# Patient Record
Sex: MALE | Race: WHITE | NOT HISPANIC OR LATINO | Employment: FULL TIME | URBAN - METROPOLITAN AREA
[De-identification: names, ages, dates, MRNs, and addresses within clinical notes are randomized per-mention and may not be internally consistent; named-entity substitution may affect disease eponyms.]

---

## 2018-06-28 ENCOUNTER — HOSPITAL ENCOUNTER (EMERGENCY)
Facility: HOSPITAL | Age: 23
Discharge: HOME | End: 2018-06-28
Attending: EMERGENCY MEDICINE
Payer: MEDICAID

## 2018-06-28 VITALS
TEMPERATURE: 99 F | HEIGHT: 70 IN | OXYGEN SATURATION: 98 % | DIASTOLIC BLOOD PRESSURE: 70 MMHG | HEART RATE: 60 BPM | RESPIRATION RATE: 18 BRPM | BODY MASS INDEX: 22.9 KG/M2 | SYSTOLIC BLOOD PRESSURE: 110 MMHG | WEIGHT: 160 LBS

## 2018-06-28 DIAGNOSIS — N50.811 TESTICULAR PAIN, RIGHT: ICD-10-CM

## 2018-06-28 DIAGNOSIS — N13.2 URETERAL STONE WITH HYDRONEPHROSIS: Primary | ICD-10-CM

## 2018-06-28 LAB
ALBUMIN SERPL BCP-MCNC: 4.6 G/DL
ALP SERPL-CCNC: 65 U/L
ALT SERPL W/O P-5'-P-CCNC: 21 U/L
ANION GAP SERPL CALC-SCNC: 14 MMOL/L
AST SERPL-CCNC: 22 U/L
BACTERIA #/AREA URNS AUTO: ABNORMAL /HPF
BASOPHILS # BLD AUTO: 0.03 K/UL
BASOPHILS NFR BLD: 0.2 %
BILIRUB SERPL-MCNC: 1.4 MG/DL
BILIRUB UR QL STRIP: NEGATIVE
BUN SERPL-MCNC: 17 MG/DL
CALCIUM SERPL-MCNC: 10.2 MG/DL
CHLORIDE SERPL-SCNC: 106 MMOL/L
CLARITY UR REFRACT.AUTO: ABNORMAL
CO2 SERPL-SCNC: 23 MMOL/L
COLOR UR AUTO: YELLOW
CREAT SERPL-MCNC: 1 MG/DL
DIFFERENTIAL METHOD: ABNORMAL
EOSINOPHIL # BLD AUTO: 0.1 K/UL
EOSINOPHIL NFR BLD: 0.6 %
ERYTHROCYTE [DISTWIDTH] IN BLOOD BY AUTOMATED COUNT: 11.2 %
EST. GFR  (AFRICAN AMERICAN): >60 ML/MIN/1.73 M^2
EST. GFR  (NON AFRICAN AMERICAN): >60 ML/MIN/1.73 M^2
GLUCOSE SERPL-MCNC: 110 MG/DL
GLUCOSE UR QL STRIP: NEGATIVE
HCT VFR BLD AUTO: 43.3 %
HGB BLD-MCNC: 14.7 G/DL
HGB UR QL STRIP: ABNORMAL
HYALINE CASTS UR QL AUTO: 0 /LPF
IMM GRANULOCYTES # BLD AUTO: 0.06 K/UL
IMM GRANULOCYTES NFR BLD AUTO: 0.5 %
KETONES UR QL STRIP: ABNORMAL
LEUKOCYTE ESTERASE UR QL STRIP: ABNORMAL
LIPASE SERPL-CCNC: 24 U/L
LYMPHOCYTES # BLD AUTO: 1.2 K/UL
LYMPHOCYTES NFR BLD: 9.4 %
MCH RBC QN AUTO: 33 PG
MCHC RBC AUTO-ENTMCNC: 33.9 G/DL
MCV RBC AUTO: 97 FL
MICROSCOPIC COMMENT: ABNORMAL
MONOCYTES # BLD AUTO: 0.4 K/UL
MONOCYTES NFR BLD: 3.4 %
NEUTROPHILS # BLD AUTO: 10.9 K/UL
NEUTROPHILS NFR BLD: 85.9 %
NITRITE UR QL STRIP: NEGATIVE
NRBC BLD-RTO: 0 /100 WBC
PH UR STRIP: 5 [PH] (ref 5–8)
PLATELET # BLD AUTO: 205 K/UL
PMV BLD AUTO: 9.7 FL
POTASSIUM SERPL-SCNC: 3.8 MMOL/L
PROT SERPL-MCNC: 7.9 G/DL
PROT UR QL STRIP: ABNORMAL
RBC # BLD AUTO: 4.45 M/UL
RBC #/AREA URNS AUTO: >100 /HPF (ref 0–4)
SODIUM SERPL-SCNC: 143 MMOL/L
SP GR UR STRIP: 1.02 (ref 1–1.03)
SQUAMOUS #/AREA URNS AUTO: 1 /HPF
URN SPEC COLLECT METH UR: ABNORMAL
UROBILINOGEN UR STRIP-ACNC: NEGATIVE EU/DL
WBC # BLD AUTO: 12.7 K/UL
WBC #/AREA URNS AUTO: 10 /HPF (ref 0–5)

## 2018-06-28 PROCEDURE — 99284 EMERGENCY DEPT VISIT MOD MDM: CPT | Mod: ,,, | Performed by: PHYSICIAN ASSISTANT

## 2018-06-28 PROCEDURE — 96375 TX/PRO/DX INJ NEW DRUG ADDON: CPT

## 2018-06-28 PROCEDURE — 63600175 PHARM REV CODE 636 W HCPCS: Performed by: PHYSICIAN ASSISTANT

## 2018-06-28 PROCEDURE — 81001 URINALYSIS AUTO W/SCOPE: CPT

## 2018-06-28 PROCEDURE — 96374 THER/PROPH/DIAG INJ IV PUSH: CPT

## 2018-06-28 PROCEDURE — 99284 EMERGENCY DEPT VISIT MOD MDM: CPT | Mod: 25

## 2018-06-28 PROCEDURE — 96361 HYDRATE IV INFUSION ADD-ON: CPT

## 2018-06-28 PROCEDURE — 25000003 PHARM REV CODE 250: Performed by: PHYSICIAN ASSISTANT

## 2018-06-28 PROCEDURE — 80053 COMPREHEN METABOLIC PANEL: CPT

## 2018-06-28 PROCEDURE — 85025 COMPLETE CBC W/AUTO DIFF WBC: CPT

## 2018-06-28 PROCEDURE — 83690 ASSAY OF LIPASE: CPT

## 2018-06-28 RX ORDER — ONDANSETRON 4 MG/1
4 TABLET, ORALLY DISINTEGRATING ORAL EVERY 8 HOURS PRN
Qty: 12 TABLET | Refills: 0 | Status: SHIPPED | OUTPATIENT
Start: 2018-06-28 | End: 2018-07-01

## 2018-06-28 RX ORDER — KETOROLAC TROMETHAMINE 30 MG/ML
10 INJECTION, SOLUTION INTRAMUSCULAR; INTRAVENOUS
Status: COMPLETED | OUTPATIENT
Start: 2018-06-28 | End: 2018-06-28

## 2018-06-28 RX ORDER — ONDANSETRON 2 MG/ML
4 INJECTION INTRAMUSCULAR; INTRAVENOUS
Status: COMPLETED | OUTPATIENT
Start: 2018-06-28 | End: 2018-06-28

## 2018-06-28 RX ORDER — HYDROCODONE BITARTRATE AND ACETAMINOPHEN 5; 325 MG/1; MG/1
1 TABLET ORAL EVERY 6 HOURS PRN
Qty: 10 TABLET | Refills: 0 | Status: SHIPPED | OUTPATIENT
Start: 2018-06-28

## 2018-06-28 RX ORDER — TAMSULOSIN HYDROCHLORIDE 0.4 MG/1
0.4 CAPSULE ORAL DAILY
Qty: 7 CAPSULE | Refills: 0 | Status: SHIPPED | OUTPATIENT
Start: 2018-06-28 | End: 2019-06-28

## 2018-06-28 RX ADMIN — KETOROLAC TROMETHAMINE 10 MG: 30 INJECTION, SOLUTION INTRAMUSCULAR at 07:06

## 2018-06-28 RX ADMIN — ONDANSETRON 4 MG: 2 INJECTION, SOLUTION INTRAMUSCULAR; INTRAVENOUS at 07:06

## 2018-06-28 RX ADMIN — SODIUM CHLORIDE 1000 ML: 0.9 INJECTION, SOLUTION INTRAVENOUS at 07:06

## 2018-06-28 NOTE — ED NOTES
Pt identifiers checked and accurate with Koko Bueno    Pt reports to ED with complaints of vomiting and back pain beginning this afternoon. Pt reports right lower back pain, pain when urinating, dry heaving. Pt denies trauma, falls, blood in urine, diarrhea, headache, dizziness, LOC.     LOC: The patient is awake, alert and aware of environment with an appropriate affect, the patient is oriented x 3 and speaking appropriately.  APPEARANCE: Patient resting comfortably and in no acute distress, patient is clean and well groomed  SKIN: The skin is warm and dry, color consistent with ethnicity, patient has normal skin turgor and moist mucus membranes, skin intact, no breakdown or bruising noted.  MUSCULOSKELETAL: Patient moving all extremities well, no obvious swelling or deformities noted. Pt ambulates unassisted, steady gait   RESPIRATORY: Airway is open and patent; respirations are spontaneous, patient has a normal effort and rate, no accessory muscle use noted.   ABDOMEN: Soft and tender to palpation along right flank and abdomen, no distention noted.   NEUROLOGIC: PERRL, 3 mm bilaterally, eyes open spontaneously, behavior appropriate to situation, follows commands

## 2018-06-28 NOTE — ED PROVIDER NOTES
Encounter Date: 6/28/2018       History     Chief Complaint   Patient presents with    Emesis     hx kidney stones     22-year-old male with a history of kidney stones presents to the ED the chief complaint of emesis.  Patient reports acute onset of severe right flank pain today.  Pain radiates around to the right lower abdomen and to the right testicle.  Reports associated nausea with multiple episodes of vomiting, dysuria, chills. Symptoms are similar to previous kidney stone.  Denies penile discharge, fever.          Review of patient's allergies indicates:  No Known Allergies  Past Medical History:   Diagnosis Date    Kidney stones      Past Surgical History:   Procedure Laterality Date    NONE N/A 1/18/2015     Family History   Problem Relation Age of Onset    Diabetes Mother     Hypertension Mother     Cancer Neg Hx     Heart disease Neg Hx     Stroke Neg Hx      Social History   Substance Use Topics    Smoking status: Never Smoker    Smokeless tobacco: Not on file    Alcohol use No     Review of Systems   Constitutional: Positive for chills. Negative for fever.   HENT: Negative for sore throat.    Respiratory: Negative for shortness of breath.    Cardiovascular: Negative for chest pain.   Gastrointestinal: Positive for abdominal pain and vomiting. Negative for nausea.   Genitourinary: Positive for dysuria, flank pain and testicular pain.   Musculoskeletal: Negative for back pain.   Skin: Negative for rash.   Neurological: Negative for weakness.   Hematological: Does not bruise/bleed easily.       Physical Exam     Initial Vitals [06/28/18 1729]   BP Pulse Resp Temp SpO2   (!) 156/87 99 17 98 °F (36.7 °C) 98 %      MAP       --         Physical Exam    Nursing note and vitals reviewed.  Constitutional: He appears well-developed and well-nourished.  Non-toxic appearance. He does not appear ill. He appears distressed.   Patient in distress due to pain   HENT:   Head: Normocephalic and atraumatic.    Neck: Normal range of motion. Neck supple.   Cardiovascular: Normal rate and regular rhythm. Exam reveals no gallop, no distant heart sounds and no friction rub.    No murmur heard.  Pulmonary/Chest: Effort normal and breath sounds normal. No accessory muscle usage. No tachypnea. No respiratory distress. He has no decreased breath sounds. He has no wheezes. He has no rhonchi. He has no rales.   Abdominal: He exhibits no distension. There is generalized tenderness. There is CVA tenderness (On right).   Genitourinary: Circumcised.   Genitourinary Comments: Diffuse right testicular tenderness. No significant swelling.    Musculoskeletal: Normal range of motion.   Neurological: He is alert.   Skin: Skin is warm and dry. No rash noted. No pallor.   Psychiatric: He has a normal mood and affect. His behavior is normal.         ED Course   Procedures  Labs Reviewed   CBC W/ AUTO DIFFERENTIAL - Abnormal; Notable for the following:        Result Value    RBC 4.45 (*)     MCH 33.0 (*)     RDW 11.2 (*)     Gran # (ANC) 10.9 (*)     Immature Grans (Abs) 0.06 (*)     Gran% 85.9 (*)     Lymph% 9.4 (*)     Mono% 3.4 (*)     All other components within normal limits   COMPREHENSIVE METABOLIC PANEL - Abnormal; Notable for the following:     Total Bilirubin 1.4 (*)     All other components within normal limits   URINALYSIS, REFLEX TO URINE CULTURE - Abnormal; Notable for the following:     Appearance, UA Cloudy (*)     Protein, UA 1+ (*)     Ketones, UA Trace (*)     Occult Blood UA 3+ (*)     Leukocytes, UA Trace (*)     All other components within normal limits    Narrative:     Preferred Collection Type->Urine, Clean Catch   URINALYSIS MICROSCOPIC - Abnormal; Notable for the following:     RBC, UA >100 (*)     WBC, UA 10 (*)     All other components within normal limits    Narrative:     Preferred Collection Type->Urine, Clean Catch   LIPASE          Imaging Results          CT Renal Stone Study ABD Pelvis WO (Final result)      Abnormal  Result time 06/28/18 20:40:20    Final result by Jose E Avila MD (06/28/18 20:40:20)                 Impression:      Partially obstructing 2 mm stone in the right distal ureter with mild right hydronephrosis and minimal right hydroureter    Punctate nonobstructing right renal stone.    This report was flagged in [employer] as abnormal.    Electronically signed by resident: Karan Valencia  Date:    06/28/2018  Time:    20:11    Electronically signed by: Jose E Avila MD  Date:    06/28/2018  Time:    20:40             Narrative:    EXAMINATION:  CT RENAL STONE STUDY ABD PELVIS WO    CLINICAL HISTORY:  Flank pain, stone disease suspected;    TECHNIQUE:  Axial CT images of the abdomen and pelvis obtained without the administration of intravenous contrast.  Coronal and sagittal reformats are provided.    COMPARISON:  CT renal stone study 01/18/2015.    FINDINGS:  The lung bases are clear.  No pleural effusion is seen.    The visualized portions of the heart appear normal. No pericardial effusion.    The liver is normal in size and attenuation.  No focal hepatic abnormality is seen. The gallbladder is normal.  No intrahepatic or extrahepatic biliary ductal dilatation is identified. The spleen is normal.    The stomach, pancreas, and adrenal glands are normal.    The kidneys are normal in size and location. There is a punctate nonobstructing stone in the lower pole of the right kidney.  There is a 2 mm stone in the right distal ureter (series 2, image 76).  Mild right hydronephrosis with minimal right hydroureter.  No hydroureteronephrosis on the left.  The urinary bladder is normal. The prostate is normal.    The visualized loops of small and large bowel show no evidence of obstruction or inflammation.    No ascites, free fluid, or intraperitoneal free air is identified.    There is no evidence of lymph node enlargement in the abdomen or pelvis.    The abdominal aorta is normal in course and caliber without  significant atherosclerotic calcifications.    The osseous structures demonstrate no significant abnormality.    The extraperitoneal soft tissues are normal.                               US Scrotum And Testicles (Final result)  Result time 06/28/18 19:43:03    Final result by Jose Jones MD (06/28/18 19:43:03)                 Impression:      No significant abnormality.    Electronically signed by resident: Dea Ramirez  Date:    06/28/2018  Time:    19:35    Electronically signed by: Jose Jones MD  Date:    06/28/2018  Time:    19:43             Narrative:    EXAMINATION:  US SCROTUM AND TESTICLES    CLINICAL HISTORY:  Right testicular pain    TECHNIQUE:  Sonography of the scrotum and testes.    COMPARISON:  Ultrasound scrotum and testicles 01/18/2015    FINDINGS:  Right Testicle:    Size: 5.2 x 3.0 x 2.2 cm    Appearance: Normal    Flow: Normal arterial and venous flow    Epididymis: Normal    Hydrocele: None    Varicocele: None    Left Testicle:    Size: 4.7 x 2.7 x 1.9 cm    Appearance: Normal    Flow: Normal arterial and venous flow    Epididymis: Normal    Hydrocele: None    Varicocele: None                                 Medical Decision Making:   History:   Old Medical Records: I decided to obtain old medical records.  Differential Diagnosis:   My differential diagnosis includes but is not limited to:  Renal stone, renal colic, pyelonephritis, testicular torsion, orchitis, epididymitis   Clinical Tests:   Lab Tests: Ordered and Reviewed       APC / Resident Notes:   22-year-old male presents for evaluation of right flank pain, abdominal pain, vomiting that began today.  He appears uncomfortable secondary to pain and nausea.  CVA tenderness on the right.  There is moderate diffuse abdominal tenderness. There is also right testicular tenderness. No significant swelling.    UA with microscopic hematuria, trace leukocytes, no bacteria.  Normal renal function. CT renal stone study reveals 2  mm obstructing stone in the distal ureter with minimal right hydroureter.  Patient feeling much better after treatment.  I will discharge patient with urine strainer, antiemetics, pain medication, Flomax and will refer to Urology for re-evaluation of recurrent stones.  Return precautions given.  Patient is stable for discharge. I have reviewed the patient's records and discussed this case with my supervising physician.                   Clinical Impression:   The primary encounter diagnosis was Ureteral stone with hydronephrosis. A diagnosis of Testicular pain, right was also pertinent to this visit.      Disposition:   Disposition: Discharged  Condition: Stable                        Caren Lange PA-C  06/28/18 [id number]